# Patient Record
Sex: MALE | Race: WHITE | NOT HISPANIC OR LATINO | Employment: STUDENT | URBAN - METROPOLITAN AREA
[De-identification: names, ages, dates, MRNs, and addresses within clinical notes are randomized per-mention and may not be internally consistent; named-entity substitution may affect disease eponyms.]

---
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
## 2025-03-10 ENCOUNTER — OFFICE VISIT (OUTPATIENT)
Dept: INTERNAL MEDICINE | Age: 20
End: 2025-03-10
Payer: COMMERCIAL

## 2025-03-10 VITALS
HEART RATE: 68 BPM | OXYGEN SATURATION: 99 % | SYSTOLIC BLOOD PRESSURE: 114 MMHG | BODY MASS INDEX: 22.46 KG/M2 | WEIGHT: 160.4 LBS | TEMPERATURE: 98.6 F | HEIGHT: 71 IN | DIASTOLIC BLOOD PRESSURE: 70 MMHG

## 2025-03-10 DIAGNOSIS — R53.83 FATIGUE, UNSPECIFIED TYPE: Primary | ICD-10-CM

## 2025-03-10 DIAGNOSIS — Z11.59 NEED FOR HEPATITIS C SCREENING TEST: ICD-10-CM

## 2025-03-10 DIAGNOSIS — G47.9 SLEEP DISTURBANCE: ICD-10-CM

## 2025-03-10 DIAGNOSIS — G43.709 CHRONIC MIGRAINE WITHOUT AURA WITHOUT STATUS MIGRAINOSUS, NOT INTRACTABLE: ICD-10-CM

## 2025-03-10 LAB
25(OH)D3 SERPL-MCNC: 30.4 NG/ML (ref 30–100)
ALBUMIN SERPL-MCNC: 4.9 G/DL (ref 3.5–5.2)
ALBUMIN/GLOB SERPL: 2 G/DL
ALP SERPL-CCNC: 101 U/L (ref 39–117)
ALT SERPL W P-5'-P-CCNC: 12 U/L (ref 1–41)
ANION GAP SERPL CALCULATED.3IONS-SCNC: 8.9 MMOL/L (ref 5–15)
AST SERPL-CCNC: 16 U/L (ref 1–40)
BASOPHILS # BLD AUTO: 0.03 10*3/MM3 (ref 0–0.2)
BASOPHILS NFR BLD AUTO: 0.6 % (ref 0–1.5)
BILIRUB SERPL-MCNC: 0.8 MG/DL (ref 0–1.2)
BUN SERPL-MCNC: 10 MG/DL (ref 6–20)
BUN/CREAT SERPL: 8.9 (ref 7–25)
CALCIUM SPEC-SCNC: 10.1 MG/DL (ref 8.6–10.5)
CHLORIDE SERPL-SCNC: 102 MMOL/L (ref 98–107)
CO2 SERPL-SCNC: 29.1 MMOL/L (ref 22–29)
CREAT SERPL-MCNC: 1.12 MG/DL (ref 0.76–1.27)
DEPRECATED RDW RBC AUTO: 37.4 FL (ref 37–54)
EGFRCR SERPLBLD CKD-EPI 2021: 97.1 ML/MIN/1.73
EOSINOPHIL # BLD AUTO: 0.13 10*3/MM3 (ref 0–0.4)
EOSINOPHIL NFR BLD AUTO: 2.7 % (ref 0.3–6.2)
ERYTHROCYTE [DISTWIDTH] IN BLOOD BY AUTOMATED COUNT: 12 % (ref 12.3–15.4)
ERYTHROCYTE [SEDIMENTATION RATE] IN BLOOD: <1 MM/HR (ref 0–15)
FOLATE SERPL-MCNC: 12 NG/ML (ref 4.78–24.2)
GLOBULIN UR ELPH-MCNC: 2.4 GM/DL
GLUCOSE SERPL-MCNC: 73 MG/DL (ref 65–99)
HCT VFR BLD AUTO: 49.3 % (ref 37.5–51)
HCV AB SER QL: NORMAL
HGB BLD-MCNC: 17 G/DL (ref 13–17.7)
IMM GRANULOCYTES # BLD AUTO: 0.01 10*3/MM3 (ref 0–0.05)
IMM GRANULOCYTES NFR BLD AUTO: 0.2 % (ref 0–0.5)
IRON 24H UR-MRATE: 125 MCG/DL (ref 59–158)
LYMPHOCYTES # BLD AUTO: 1.39 10*3/MM3 (ref 0.7–3.1)
LYMPHOCYTES NFR BLD AUTO: 28.5 % (ref 19.6–45.3)
MAGNESIUM SERPL-MCNC: 2.2 MG/DL (ref 1.7–2.2)
MCH RBC QN AUTO: 29.4 PG (ref 26.6–33)
MCHC RBC AUTO-ENTMCNC: 34.5 G/DL (ref 31.5–35.7)
MCV RBC AUTO: 85.3 FL (ref 79–97)
MONOCYTES # BLD AUTO: 0.34 10*3/MM3 (ref 0.1–0.9)
MONOCYTES NFR BLD AUTO: 7 % (ref 5–12)
NEUTROPHILS NFR BLD AUTO: 2.97 10*3/MM3 (ref 1.7–7)
NEUTROPHILS NFR BLD AUTO: 61 % (ref 42.7–76)
NRBC BLD AUTO-RTO: 0 /100 WBC (ref 0–0.2)
PLATELET # BLD AUTO: 185 10*3/MM3 (ref 140–450)
PMV BLD AUTO: 12.3 FL (ref 6–12)
POTASSIUM SERPL-SCNC: 4.4 MMOL/L (ref 3.5–5.2)
PROT SERPL-MCNC: 7.3 G/DL (ref 6–8.5)
RBC # BLD AUTO: 5.78 10*6/MM3 (ref 4.14–5.8)
SODIUM SERPL-SCNC: 140 MMOL/L (ref 136–145)
T4 FREE SERPL-MCNC: 1.5 NG/DL (ref 0.92–1.68)
TSH SERPL DL<=0.05 MIU/L-ACNC: 1.45 UIU/ML (ref 0.27–4.2)
VIT B12 BLD-MCNC: 280 PG/ML (ref 211–946)
WBC NRBC COR # BLD AUTO: 4.87 10*3/MM3 (ref 3.4–10.8)

## 2025-03-10 PROCEDURE — 83540 ASSAY OF IRON: CPT | Performed by: NURSE PRACTITIONER

## 2025-03-10 PROCEDURE — 82607 VITAMIN B-12: CPT | Performed by: NURSE PRACTITIONER

## 2025-03-10 PROCEDURE — 82306 VITAMIN D 25 HYDROXY: CPT | Performed by: NURSE PRACTITIONER

## 2025-03-10 PROCEDURE — 83735 ASSAY OF MAGNESIUM: CPT | Performed by: NURSE PRACTITIONER

## 2025-03-10 PROCEDURE — 82746 ASSAY OF FOLIC ACID SERUM: CPT | Performed by: NURSE PRACTITIONER

## 2025-03-10 PROCEDURE — 85652 RBC SED RATE AUTOMATED: CPT | Performed by: NURSE PRACTITIONER

## 2025-03-10 PROCEDURE — 86376 MICROSOMAL ANTIBODY EACH: CPT | Performed by: NURSE PRACTITIONER

## 2025-03-10 PROCEDURE — 99214 OFFICE O/P EST MOD 30 MIN: CPT | Performed by: NURSE PRACTITIONER

## 2025-03-10 PROCEDURE — 86803 HEPATITIS C AB TEST: CPT | Performed by: NURSE PRACTITIONER

## 2025-03-10 PROCEDURE — 84439 ASSAY OF FREE THYROXINE: CPT | Performed by: NURSE PRACTITIONER

## 2025-03-10 PROCEDURE — 80050 GENERAL HEALTH PANEL: CPT | Performed by: NURSE PRACTITIONER

## 2025-03-10 NOTE — PROGRESS NOTES
Answers submitted by the patient for this visit:  Problem not listed (Submitted on 3/3/2025)  Chief Complaint: Other medical problem  Reason for appointment: Fatigue  abdominal pain: No  anorexia: No  joint pain: No  change in stool: No  chest pain: No  chills: No  nasal congestion: No  cough: No  diaphoresis: Yes  fatigue: Yes  fever: No  headaches: Yes  joint swelling: No  myalgias: No  nausea: No  neck pain: No  numbness: No  rash: No  sore throat: No  swollen glands: No  dysuria: No  vertigo: No  visual change: No  vomiting: No  weakness: No  Onset: in the past 7 days  Chronicity: new  Frequency: daily  Additional information: History of myopericarditis and mono  Chief Complaint  Fatigue (The patient is coming in complaining of extreme fatigue. He states he gets tired very quick. He says host days he is getting 8 hours of sleep. The patient did have mono September of 24. )    Subjective      History of Present Illness  The patient is a 19-year-old male who presents for evaluation of fatigue, migraines, and sleep disturbance. He is accompanied by his mother.    He has been experiencing persistent fatigue since his diagnosis of myopericarditis 2 years ago, which was followed by an episode of mononucleosis last fall. Despite the resolution of symptoms from these conditions, he continues to feel chronically exhausted. He also reports occasional mild pressure in the cardiac region, although it is significantly less severe than during his myopericarditis episode. His mother notes that he has not fully recovered, with intermittent periods of wellness. He is currently attending college and has a scheduled appointment with his cardiologist at [facility] on 03/25/2025, where an echocardiogram and comprehensive workup are planned. He also has a lab order from Dr. Lee for the end of May 2025. His mother, who has a history of autoimmune conditions, is concerned about the possibility of a similar issue in him. He  underwent blood work when he had mono in September 2024, but the results are not available. He occasionally experiences palpitations, but these are not consistent.    He reports a decrease in the frequency of his migraines, which now occur approximately once every other week, down from 3 times a week. He has been taking magnesium supplements since last summer, which have been beneficial. He also takes Excedrin or Tylenol as needed. He has a history of headaches since childhood, with a strong family history of migraines. His migraines are unpredictable, sometimes accompanied by nausea and requiring rest, while other times they are less severe.    His sleep quality has deteriorated, with difficulty falling asleep and persistent tiredness throughout the day. He reports no stress at home or with his roommates.    Supplemental Information  He had asthma like his mother and outgrew it so far.    FAMILY HISTORY  The patient's mother has Graves' disease, mixed connective tissue disease, and has had pericarditis 2 or 3 times related to autoimmune conditions. The patient's maternal grandmother has Ménière's disease, and the patient's mother has BPPV and possible vestibular migraines. The patient's father had late-onset diabetes.    MEDICATIONS  Current: magnesium supplements, Excedrin, Tylenol       Past Medical History:   Diagnosis Date    Allergic     Ongoing    Allergies     Asthma     childhood    History of medical problems January 2023    Myopericarditis    Ingrown toenail 2021    Migraines     myoPericarditis     Verruca 2019        Past Surgical History:   Procedure Laterality Date    COLONOSCOPY N/A 02/23/2024    Procedure: COLONOSCOPY;  Surgeon: Rustam Ford MD;  Location: [facility] END[facility];  Service: Gastroenterology;  Laterality: N/A;  NORMAL    FOOT SURGERY      WISDOM TOOTH EXTRACTION          Social History     Tobacco Use   Smoking Status Never    Passive exposure: Never   Smokeless Tobacco Never     "    Patient Care Team:  Anatoly Barrera MD as PCP - General (Internal Medicine)    No Known Allergies       Current Outpatient Medications:     cetirizine (zyrTEC) 10 MG tablet, Take 1 tablet by mouth Daily., Disp: 10 tablet, Rfl: 0    docusate sodium (COLACE) 50 MG capsule, Take 2 capsules by mouth 2 (Two) Times a Day., Disp: , Rfl:     montelukast (Singulair) 10 MG tablet, Take 1 tablet by mouth Every Night., Disp: 90 tablet, Rfl: 3    vitamin C (ASCORBIC ACID) 250 MG tablet, Take 1 tablet by mouth Daily., Disp: , Rfl:     vitamin D3 125 MCG (5000 UT) capsule capsule, Take 1 capsule by mouth Daily., Disp: , Rfl:     zinc sulfate (ZINCATE) 220 (50 Zn) MG capsule, Take 1 capsule by mouth Daily., Disp: , Rfl:     Objective     Vitals:    03/10/25 1332   BP: 114/70   BP Location: Left arm   Patient Position: Sitting   Cuff Size: Large Adult   Pulse: 68   Temp: 98.6 °F (37 °C)   TempSrc: Temporal   SpO2: 99%   Weight: 72.8 kg (160 lb 6.4 oz)   Height: 180.3 cm (71\")        Wt Readings from Last 3 Encounters:   03/10/25 72.8 kg (160 lb 6.4 oz) (61%, Z= 0.27)*   10/13/24 76.2 kg (168 lb) (72%, Z= 0.60)*   08/13/24 73.9 kg (163 lb) (67%, Z= 0.45)*     * Growth percentiles are based on CDC (Boys, 2-20 Years) data.        BP Readings from Last 3 Encounters:   03/10/25 114/70   10/13/24 132/78   08/13/24 144/87          Physical Exam  Vitals reviewed.   Constitutional:       General: He is not in acute distress.  HENT:      Head: Normocephalic and atraumatic.   Neck:      Thyroid: No thyroid mass.   Cardiovascular:      Rate and Rhythm: Normal rate and regular rhythm.   Pulmonary:      Effort: Pulmonary effort is normal.      Breath sounds: Normal breath sounds. No wheezing, rhonchi or rales.   Abdominal:      General: There is no distension.      Palpations: Abdomen is soft. There is no mass.      Tenderness: There is no abdominal tenderness.   Musculoskeletal:      Right lower leg: No edema.      Left lower leg: No " edema.   Lymphadenopathy:      Cervical: No cervical adenopathy.   Skin:     General: Skin is warm and dry.   Neurological:      General: No focal deficit present.      Mental Status: He is alert.   Psychiatric:         Thought Content: Thought content normal.                Result Review   The following data was reviewed by: ANUPAMA Navarro on 03/10/2025:  [x]  Tests & Results  []  Hospitalization/Emergency Department/Urgent Care  [x]  Internal/External Consultant Notes       Assessment and Plan     Diagnoses and all orders for this visit:    1. Fatigue, unspecified type (Primary)  -     Comprehensive Metabolic Panel; Future  -     CBC & Differential; Future  -     Vitamin D,25-Hydroxy; Future  -     TSH+Free T4; Future  -     Vitamin B12; Future  -     Folate; Future  -     Iron; Future  -     Thyroid Peroxidase Antibody; Future  -     Magnesium; Future  -     Sedimentation Rate; Future  -     KYLE by IFA, Reflex 9-biomarkers profile; Future  -     Sedimentation Rate  -     Magnesium  -     Thyroid Peroxidase Antibody  -     Iron  -     Folate  -     Vitamin B12  -     TSH+Free T4  -     Vitamin D,25-Hydroxy  -     Comprehensive Metabolic Panel  -     CBC & Differential    2. Chronic migraine without aura without status migrainosus, not intractable    3. Sleep disturbance    4. Need for hepatitis C screening test  -     Hepatitis C Antibody; Future  -     Hepatitis C Antibody         Assessment & Plan  1. Fatigue.  The etiology of the fatigue could be multifactorial, potentially stemming from vitamin D deficiency or sleep disturbances. A comprehensive panel of laboratory tests will be conducted, including thyroid studies with thyroid peroxidase antibody test, vitamin D, vitamin B12, folate, magnesium level, hepatitis C antibody, iron level, sed rate, and KYLE. He has been advised to consult with his cardiologist regarding the safety of engaging in pickleball.    2. Migraines.  He has been counseled against the  use of Excedrin, Fioricet, and ibuprofen for migraine management due to the risk of rebound headaches. Samples of Ubrelvy have been provided for use as needed during migraine episodes. He has been instructed to take one pill at the onset of a migraine and to carry a pill with him. If the headache persists after 2 hours, he may take a second dose.    3. Sleep disturbance.  Check labs today and follow-up with PCP as scheduled.    Pediatric BMI = 46 %ile (Z= -0.09) based on CDC (Boys, 2-20 Years) BMI-for-age based on BMI available on 3/10/2025.. BMI is within normal parameters. No other follow-up for BMI required.       Patient was given instructions and counseling regarding his condition or for health maintenance advice. Please see specific information pulled into the AVS if appropriate.     Follow Up   Return for Next scheduled follow up.    Patient or patient representative verbalized consent for the use of Ambient Listening during the visit with  ANUPAMA Navarro for chart documentation. 3/10/2025  14:12 EDT    ANUPAMA Navarro

## 2025-03-12 LAB — THYROPEROXIDASE AB SERPL-ACNC: 10 IU/ML (ref 0–26)

## 2025-03-14 ENCOUNTER — LAB (OUTPATIENT)
Dept: LAB | Facility: HOSPITAL | Age: 20
End: 2025-03-14
Payer: COMMERCIAL

## 2025-03-14 DIAGNOSIS — R53.83 FATIGUE, UNSPECIFIED TYPE: ICD-10-CM

## 2025-03-14 PROCEDURE — 86038 ANTINUCLEAR ANTIBODIES: CPT

## 2025-03-14 PROCEDURE — 36415 COLL VENOUS BLD VENIPUNCTURE: CPT

## 2025-03-17 LAB
ANA SER QL IF: NEGATIVE
LABORATORY COMMENT REPORT: NORMAL

## 2025-04-18 RX ORDER — MONTELUKAST SODIUM 10 MG/1
10 TABLET ORAL NIGHTLY
Qty: 90 TABLET | Refills: 3 | Status: SHIPPED | OUTPATIENT
Start: 2025-04-18

## 2025-05-19 ENCOUNTER — TELEPHONE (OUTPATIENT)
Dept: INTERNAL MEDICINE | Age: 20
End: 2025-05-19
Payer: COMMERCIAL

## 2025-05-19 ENCOUNTER — HOSPITAL ENCOUNTER (EMERGENCY)
Facility: HOSPITAL | Age: 20
Discharge: HOME | End: 2025-05-19
Attending: EMERGENCY MEDICINE | Admitting: EMERGENCY MEDICINE
Payer: COMMERCIAL

## 2025-05-19 VITALS
HEIGHT: 71 IN | TEMPERATURE: 98.2 F | WEIGHT: 162.7 LBS | DIASTOLIC BLOOD PRESSURE: 69 MMHG | SYSTOLIC BLOOD PRESSURE: 101 MMHG | OXYGEN SATURATION: 96 % | HEART RATE: 52 BPM | BODY MASS INDEX: 22.78 KG/M2 | RESPIRATION RATE: 16 BRPM

## 2025-05-19 DIAGNOSIS — R21 RASH: ICD-10-CM

## 2025-05-19 DIAGNOSIS — T78.40XA ALLERGIC REACTION, INITIAL ENCOUNTER: Primary | ICD-10-CM

## 2025-05-19 DIAGNOSIS — T78.40XD ALLERGIC REACTION, SUBSEQUENT ENCOUNTER: Primary | ICD-10-CM

## 2025-05-19 DIAGNOSIS — R22.0 SWELLING OF UPPER LIP: ICD-10-CM

## 2025-05-19 PROCEDURE — 25010000002 FAMOTIDINE 10 MG/ML SOLUTION: Performed by: NURSE PRACTITIONER

## 2025-05-19 PROCEDURE — 99282 EMERGENCY DEPT VISIT SF MDM: CPT

## 2025-05-19 PROCEDURE — 25010000002 METHYLPREDNISOLONE PER 125 MG: Performed by: NURSE PRACTITIONER

## 2025-05-19 PROCEDURE — 96375 TX/PRO/DX INJ NEW DRUG ADDON: CPT

## 2025-05-19 PROCEDURE — 25010000002 DIPHENHYDRAMINE PER 50 MG: Performed by: NURSE PRACTITIONER

## 2025-05-19 PROCEDURE — 96374 THER/PROPH/DIAG INJ IV PUSH: CPT

## 2025-05-19 RX ORDER — FAMOTIDINE 20 MG/1
20 TABLET, FILM COATED ORAL 2 TIMES DAILY
Qty: 6 TABLET | Refills: 0 | Status: SHIPPED | OUTPATIENT
Start: 2025-05-19 | End: 2025-05-23 | Stop reason: SDUPTHER

## 2025-05-19 RX ORDER — DIPHENHYDRAMINE HYDROCHLORIDE 50 MG/ML
50 INJECTION, SOLUTION INTRAMUSCULAR; INTRAVENOUS ONCE
Status: COMPLETED | OUTPATIENT
Start: 2025-05-19 | End: 2025-05-19

## 2025-05-19 RX ORDER — DIPHENHYDRAMINE HCL 25 MG
25 CAPSULE ORAL EVERY 6 HOURS
Qty: 12 CAPSULE | Refills: 0 | Status: SHIPPED | OUTPATIENT
Start: 2025-05-19 | End: 2025-05-19

## 2025-05-19 RX ORDER — EPINEPHRINE 0.3 MG/.3ML
0.3 INJECTION SUBCUTANEOUS ONCE
Qty: 1 EACH | Refills: 0 | Status: SHIPPED | OUTPATIENT
Start: 2025-05-19 | End: 2025-05-19

## 2025-05-19 RX ORDER — PREDNISONE 20 MG/1
60 TABLET ORAL DAILY
Qty: 9 TABLET | Refills: 0 | Status: SHIPPED | OUTPATIENT
Start: 2025-05-19 | End: 2025-05-23

## 2025-05-19 RX ORDER — FAMOTIDINE 20 MG/1
20 TABLET, FILM COATED ORAL 2 TIMES DAILY
Qty: 6 TABLET | Refills: 0 | Status: SHIPPED | OUTPATIENT
Start: 2025-05-19 | End: 2025-05-19

## 2025-05-19 RX ORDER — DIPHENHYDRAMINE HCL 25 MG
25 CAPSULE ORAL EVERY 6 HOURS
Qty: 12 CAPSULE | Refills: 0 | Status: SHIPPED | OUTPATIENT
Start: 2025-05-19 | End: 2025-05-22

## 2025-05-19 RX ORDER — FAMOTIDINE 10 MG/ML
20 INJECTION, SOLUTION INTRAVENOUS ONCE
Status: COMPLETED | OUTPATIENT
Start: 2025-05-19 | End: 2025-05-19

## 2025-05-19 RX ORDER — PREDNISONE 20 MG/1
60 TABLET ORAL DAILY
Qty: 9 TABLET | Refills: 0 | Status: SHIPPED | OUTPATIENT
Start: 2025-05-19 | End: 2025-05-19

## 2025-05-19 RX ADMIN — DIPHENHYDRAMINE HYDROCHLORIDE 50 MG: 50 INJECTION, SOLUTION INTRAMUSCULAR; INTRAVENOUS at 03:19

## 2025-05-19 RX ADMIN — METHYLPREDNISOLONE SODIUM SUCCINATE 125 MG: 125 INJECTION INTRAMUSCULAR; INTRAVENOUS at 03:19

## 2025-05-19 RX ADMIN — FAMOTIDINE 20 MG: 10 INJECTION INTRAVENOUS at 03:19

## 2025-05-19 NOTE — ED PROVIDER NOTES
"SHARED VISIT ATTESTATION:    This visit was performed by myself and an APC.  I personally approved the management plan/medical decision making and take responsibility for the patient management.      SHARED VISIT NOTE:    Patient is 19 y.o. year old male that presents to the ED for evaluation of allergic reaction.     Physical Exam    ED Course:    /93   Pulse 71   Temp 98 °F (36.7 °C) (Oral)   Resp 18   Ht 180.3 cm (71\")   Wt 73.8 kg (162 lb 11.2 oz)   SpO2 98%   BMI 22.69 kg/m²       The following orders were placed and all results were independently analyzed by me:  No orders of the defined types were placed in this encounter.      Medications Given in the Emergency Department:  Medications   famotidine (PEPCID) injection 20 mg (has no administration in time range)   methylPREDNISolone sodium succinate (SOLU-Medrol) 125 mg in sterile water (preservative free) 2 mL (has no administration in time range)   diphenhydrAMINE (BENADRYL) injection 50 mg (has no administration in time range)        ED Course:    ED Course as of 05/19/25 2113   Mon May 19, 2025   0503 Patient rash has resolved and lip swelling has improved and is just trace apparent [DS]      ED Course User Index  [DS] Marisela Munoz APRN       Labs:    Lab Results (last 24 hours)       ** No results found for the last 24 hours. **             Imaging:    No Radiology Exams Resulted Within Past 24 Hours    MDM:    Procedures                         Yousuf Boudreaux MD  03:05 EDT  05/19/25         Yousuf Boudreaux MD  05/19/25 2114    "

## 2025-05-19 NOTE — TELEPHONE ENCOUNTER
Caller: CARLY MEHTA    Relationship: Mother    Best call back number: [phone]    What orders are you requesting (i.e. lab or imaging): TRYPTASE ORDER    In what timeframe would the patient need to come in: BEFORE NEXT APPOINTMENT

## 2025-05-19 NOTE — DISCHARGE INSTRUCTIONS
Follow-up with allergist of choice    Take medications as prescribed for the next 3 days to treat allergic reaction to unknown substance

## 2025-05-19 NOTE — ED PROVIDER NOTES
Time: 2:53 AM EDT  Date of encounter:  2025  Independent Historian/Clinical History and Information was obtained by:   Patient    History is limited by: N/A    Chief Complaint: allergic reaction      History of Present Illness:  Patient is a 19 y.o. year old male who presents to the emergency department for evaluation of possible allergic reaction.  2 hours prior to arrival patient stated he noticed some tingling in his lip and then he noticed some swelling in the upper lip with a rash to his neck face and trunk.  He started feeling some chest tightness but not really short of breath and no wheezing.  Patient had no known cause or source.  His father had an EpiPen that was  so he did not use it and came in.  Took 1 Benadryl prior to arrival.  Nausea vomiting      Patient Care Team  Primary Care Provider: Anatoly Barrera MD    Past Medical History:     No Known Allergies  Past Medical History:   Diagnosis Date    Allergic     Ongoing    Allergies     Asthma     childhood    History of medical problems 2023    Myopericarditis    Ingrown toenail     Migraines     myoPericarditis     Verruca      Past Surgical History:   Procedure Laterality Date    COLONOSCOPY N/A 2024    Procedure: COLONOSCOPY;  Surgeon: Rustam Ford MD;  Location: [facility] ENDOSCOPY;  Service: Gastroenterology;  Laterality: N/A;  NORMAL    FOOT SURGERY      WISDOM TOOTH EXTRACTION       Family History   Problem Relation Age of Onset    Irritable bowel syndrome Mother     Arthritis Mother     Asthma Mother     Hyperlipidemia Mother     Thyroid disease Mother     Colon polyps Father     No Known Problems Sister     No Known Problems Brother     Arthritis Maternal Grandmother     Colon polyps Maternal Grandfather     Diabetes Maternal Grandfather     Hyperlipidemia Maternal Grandfather     No Known Problems Paternal Grandmother     No Known Problems Paternal Grandfather     No Known Problems Daughter      No Known Problems Son     No Known Problems Cousin     No Known Problems Other     Rheum arthritis Neg Hx     Osteoarthritis Neg Hx     Heart failure Neg Hx     Hypertension Neg Hx     Migraines Neg Hx     Rashes / Skin problems Neg Hx     Seizures Neg Hx     Stroke Neg Hx     Colon cancer Neg Hx        Home Medications:  Prior to Admission medications    Medication Sig Start Date End Date Taking? Authorizing Provider   cetirizine (zyrTEC) 10 MG tablet Take 1 tablet by mouth Daily. 8/4/23   Jaden Mclaughlin DO   docusate sodium (COLACE) 50 MG capsule Take 2 capsules by mouth 2 (Two) Times a Day.    Tony Byrd MD   montelukast (SINGULAIR) 10 MG tablet TAKE 1 TABLET BY MOUTH EVERY NIGHT 4/18/25   Anatoly Barrera MD   vitamin C (ASCORBIC ACID) 250 MG tablet Take 1 tablet by mouth Daily.    Tony Byrd MD   vitamin D3 125 MCG (5000 UT) capsule capsule Take 1 capsule by mouth Daily.    Tony Byrd MD   zinc sulfate (ZINCATE) 220 (50 Zn) MG capsule Take 1 capsule by mouth Daily.    Tony Byrd MD        Social History:   Social History     Tobacco Use    Smoking status: Never     Passive exposure: Never    Smokeless tobacco: Never   Vaping Use    Vaping status: Never Used   Substance Use Topics    Alcohol use: Never    Drug use: Never         Review of Systems:  Review of Systems   Constitutional:  Negative for chills and fever.   HENT:  Positive for facial swelling (Upper lip). Negative for congestion, ear pain and sore throat.    Eyes:  Negative for pain.   Respiratory:  Positive for chest tightness. Negative for cough and shortness of breath.    Cardiovascular:  Negative for chest pain.   Gastrointestinal:  Negative for abdominal pain, diarrhea, nausea and vomiting.   Genitourinary:  Negative for flank pain and hematuria.   Musculoskeletal:  Negative for joint swelling.   Skin:  Positive for rash. Negative for pallor.   Neurological:  Negative for seizures and headaches.  "  All other systems reviewed and are negative.       Physical Exam:  /67 (BP Location: Left arm, Patient Position: Lying)   Pulse 50   Temp 98 °F (36.7 °C) (Oral)   Resp 16   Ht 180.3 cm (71\")   Wt 73.8 kg (162 lb 11.2 oz)   SpO2 95%   BMI 22.69 kg/m²     Physical Exam  Vitals and nursing note reviewed.   Constitutional:       General: He is not in acute distress.     Appearance: Normal appearance. He is not toxic-appearing.   HENT:      Head: Normocephalic and atraumatic.      Right Ear: Tympanic membrane, ear canal and external ear normal.      Left Ear: Tympanic membrane, ear canal and external ear normal.      Nose: Nose normal.      Mouth/Throat:      Mouth: Mucous membranes are moist.      Comments: Uvula midline and nonedematous.    No tongue swelling.    Mild swelling upper lip only mid aspect  Eyes:      General: No scleral icterus.     Conjunctiva/sclera: Conjunctivae normal.   Cardiovascular:      Rate and Rhythm: Normal rate and regular rhythm.      Pulses: Normal pulses.      Heart sounds: Normal heart sounds.   Pulmonary:      Effort: Pulmonary effort is normal. No respiratory distress.      Breath sounds: Normal breath sounds.   Abdominal:      General: Abdomen is flat. Bowel sounds are normal.      Palpations: Abdomen is soft.      Tenderness: There is no abdominal tenderness.   Musculoskeletal:         General: Normal range of motion.      Cervical back: Normal range of motion and neck supple.   Skin:     General: Skin is warm and dry.      Findings: Rash (Some macular erythema to the back of the neck and some scant urticaria to trunk) present.   Neurological:      Mental Status: He is alert and oriented to person, place, and time.   Psychiatric:         Mood and Affect: Mood normal.         Behavior: Behavior normal.                    Medical Decision Making:      Comorbidities that affect care:    Asthma    External Notes reviewed:    Previous Clinic Note: Patient with seen by " cardiology in March 25 for myopericarditis history and chest pressure      The following orders were placed and all results were independently analyzed by me:  No orders of the defined types were placed in this encounter.      Medications Given in the Emergency Department:  Medications   famotidine (PEPCID) injection 20 mg (20 mg Intravenous Given 5/19/25 0319)   methylPREDNISolone sodium succinate (SOLU-Medrol) 125 mg in sterile water (preservative free) 2 mL (125 mg Intravenous Given 5/19/25 0319)   diphenhydrAMINE (BENADRYL) injection 50 mg (50 mg Intravenous Given 5/19/25 0319)        ED Course:    ED Course as of 05/19/25 0504   Mon May 19, 2025   0503 Patient rash has resolved and lip swelling has improved and is just trace apparent [DS]      ED Course User Index  [DS] Marisela Munoz APRN       Labs:    Lab Results (last 24 hours)       ** No results found for the last 24 hours. **             Imaging:    No Radiology Exams Resulted Within Past 24 Hours      Differential Diagnosis and Discussion:    Allergic Reaction: Differential diagnosis includes but is not limited to drug side effects, contact dermatitis, autoimmune conditions, infections, mast cell disorders, serum sickness, anaphylactoid reactions, angioedema, panic or anxiety attacks.    PROCEDURES:        No orders to display       Procedures    MDM  Number of Diagnoses or Management Options  Allergic reaction, initial encounter  Diagnosis management comments: The patient was monitored in the ED for 2 hours. The patient reports significant relief of their symptoms. The patient denies shortness of breath, tongue and neck swelling, or altered mental status. The patient has no respiratory distress, hypoxia, and has stable and suitable vital signs for discharge. The patient was counseled to follow up with a primary care physician in 2-3 days. The patient was given a prescription for benadryl, pepcid, and a steroid. The patient was counseled to return to the  ED for any worsening of their symptoms or for any reassessment that they may need. Patient was counseled to return especially for shortness of breath, neck and tongue swelling, chest pain, respiratory difficulty, uncontrollable fever, or altered mental status.       Amount and/or Complexity of Data Reviewed  Tests in the medicine section of CPT®: ordered and reviewed  Obtain history from someone other than the patient: yes (Father)    Risk of Complications, Morbidity, and/or Mortality  Presenting problems: low  Management options: low    Patient Progress  Patient progress: stable             Patient Care Considerations:    LABS: I considered ordering labs, however not indicated for this complaint      Consultants/Shared Management Plan:    SHARED VISIT: I have discussed the case with my supervising physician, dr benavides who states okay to send home with meds after 2-hour observation if patient improved or stable. The substantive portion of the medical decision was made by the attesting physician who made or approve the management plan and will take responsibility for the patient.  Clinical findings were discussed and ultimate disposition was made in consult with supervising physician.    Social Determinants of Health:    Patient has presented with family members who are responsible, reliable and will ensure follow up care.      Disposition and Care Coordination:    Discharged: I considered escalation of care by admitting this patient to the hospital, however patient improved after medication    I have explained the patient´s condition, diagnoses and treatment plan based on the information available to me at this time. I have answered questions and addressed any concerns. The patient has a good  understanding of the patient´s diagnosis, condition, and treatment plan as can be expected at this point. The vital signs have been stable. The patient´s condition is stable and appropriate for discharge from the emergency  department.      The patient will pursue further outpatient evaluation with the primary care physician or other designated or consulting physician as outlined in the discharge instructions. They are agreeable to this plan of care and follow-up instructions have been explained in detail. The patient has received these instructions in written format and has expressed an understanding of the discharge instructions. The patient is aware that any significant change in condition or worsening of symptoms should prompt an immediate return to this or the closest emergency department or call to 911.  I have explained discharge medications and the need for follow up with the patient/caretakers. This was also printed in the discharge instructions. Patient was discharged with the following medications and follow up:      Medication List        New Prescriptions      diphenhydrAMINE 25 mg capsule  Commonly known as: BENADRYL  Take 1 capsule by mouth Every 6 (Six) Hours for 3 days.     EPINEPHrine 0.3 MG/0.3ML solution auto-injector injection  Commonly known as: EPIPEN  Inject 0.3 mL under the skin into the appropriate area as directed 1 (One) Time for 1 dose.     famotidine 20 MG tablet  Commonly known as: PEPCID  Take 1 tablet by mouth 2 (Two) Times a Day for 3 days.     predniSONE 20 MG tablet  Commonly known as: DELTASONE  Take 3 tablets by mouth Daily for 3 days.               Where to Get Your Medications        These medications were sent to [id number] - [place] - [address] AT [facility] [phone]  - [phone] FX  [address] [place] [address]      Phone: [phone]   diphenhydrAMINE 25 mg capsule  EPINEPHrine 0.3 MG/0.3ML solution auto-injector injection  famotidine 20 MG tablet  predniSONE 20 MG tablet      [facility] [place]  [address] [facility] [id number] [phone]    As needed       Final diagnoses:    Allergic reaction, initial encounter        ED Disposition       ED Disposition   Discharge    Condition   Stable    Comment   --               This medical record created using voice recognition software.             Marisela Munoz, ANUPAMA  05/19/25 0440       Marisela Munoz, ANUPAMA  05/19/25 0506

## 2025-05-19 NOTE — TELEPHONE ENCOUNTER
Spoke w/pt's mother Shannan, stated pt seen last night for allergic rxn w/unknown source on pt's upper lip.  Had rash, swelling, chest tightness.     Requesting test to check for mast cells.    Order pended for you if agreeable.

## 2025-05-20 ENCOUNTER — TELEPHONE (OUTPATIENT)
Dept: INTERNAL MEDICINE | Age: 20
End: 2025-05-20

## 2025-05-20 NOTE — TELEPHONE ENCOUNTER
Caller: CARLY MEHTA    Relationship: Mother    Best call back number: [phone]    What is the best time to reach you: ANYTIME    Who are you requesting to speak with (clinical staff, provider,  specific staff member): NURSE         What was the call regarding: PATIENT'S MOTHER IS CALLING TO LET PCP KNOW THAT THEY HAVE ALREADY CALLED THE [facility]CTOR AND HAVE AN APPOINTMENT SCHEDULED.      no

## 2025-05-23 ENCOUNTER — OFFICE VISIT (OUTPATIENT)
Age: 20
End: 2025-05-23
Payer: COMMERCIAL

## 2025-05-23 ENCOUNTER — LAB (OUTPATIENT)
Dept: LAB | Facility: HOSPITAL | Age: 20
End: 2025-05-23
Payer: COMMERCIAL

## 2025-05-23 VITALS
WEIGHT: 156.1 LBS | OXYGEN SATURATION: 97 % | HEART RATE: 73 BPM | HEIGHT: 71 IN | SYSTOLIC BLOOD PRESSURE: 100 MMHG | BODY MASS INDEX: 21.85 KG/M2 | DIASTOLIC BLOOD PRESSURE: 62 MMHG

## 2025-05-23 DIAGNOSIS — T78.40XD ALLERGIC REACTION, SUBSEQUENT ENCOUNTER: ICD-10-CM

## 2025-05-23 DIAGNOSIS — T78.40XD ALLERGIC REACTION, SUBSEQUENT ENCOUNTER: Primary | ICD-10-CM

## 2025-05-23 PROCEDURE — 36415 COLL VENOUS BLD VENIPUNCTURE: CPT

## 2025-05-23 PROCEDURE — 83520 IMMUNOASSAY QUANT NOS NONAB: CPT

## 2025-05-23 RX ORDER — PREDNISONE 10 MG/1
10 TABLET ORAL 2 TIMES DAILY
Qty: 20 TABLET | Refills: 0 | Status: SHIPPED | OUTPATIENT
Start: 2025-05-23 | End: 2025-05-23 | Stop reason: SDUPTHER

## 2025-05-23 RX ORDER — FAMOTIDINE 20 MG/1
TABLET, FILM COATED ORAL
COMMUNITY
Start: 2025-05-21 | End: 2025-05-23 | Stop reason: SDUPTHER

## 2025-05-23 RX ORDER — FAMOTIDINE 20 MG/1
20 TABLET, FILM COATED ORAL 2 TIMES DAILY
Qty: 60 TABLET | Refills: 0 | Status: SHIPPED | OUTPATIENT
Start: 2025-05-23 | End: 2025-05-23 | Stop reason: SDUPTHER

## 2025-05-23 RX ORDER — FAMOTIDINE 20 MG/1
20 TABLET, FILM COATED ORAL 2 TIMES DAILY
Qty: 60 TABLET | Refills: 0 | Status: SHIPPED | OUTPATIENT
Start: 2025-05-23

## 2025-05-23 RX ORDER — PREDNISONE 10 MG/1
10 TABLET ORAL 2 TIMES DAILY
Qty: 20 TABLET | Refills: 0 | Status: SHIPPED | OUTPATIENT
Start: 2025-05-23

## 2025-05-23 RX ORDER — LANOLIN ALCOHOL/MO/W.PET/CERES
CREAM (GRAM) TOPICAL
COMMUNITY

## 2025-05-23 NOTE — PROGRESS NOTES
Chief Complaint  Allergic Reaction (Pt has started to have hives,so bad his  Lips swelled up hives all over back and stomach. Went to [facility] and they give him IV medication and meds. This happens at night. Did not start until he come home from College. )    Delfina Felipe is a 19 y.o. male who presents to [facility]     History of Present Illness  The patient presents for evaluation of hives.    He has been experiencing hives, indicative of an allergic reaction, since last week. He sought emergency care on 05/19/2025, where he was prescribed medication, steroids and Pepcid, already taking Singulair and Zyrtec, he also was and provided with an EpiPen. He has a scheduled appointment with an allergist on 07/28/2025. The cause of the reaction remains unknown to him. He reports no recent changes in diet, skincare products, or clothing that could have triggered the reaction. He describes his skin as mildly sensitive and prone to dryness.  But does not have a lot of allergies that he is aware.  During his ER visit, he experienced lip swelling, which has not recurred, but he continues to have a reactive skin on his back. He was prescribed prednisone, which he completed. He reports no wheezing or shortness of breath.  No further lip swelling he recently acquired a new mattress upon returning from college, but he had used it during spring break without any issues. He does not recall any recent tick bites, although he may have had one in early fall, which he promptly removed. He underwent blood work this morning. He has a history of asthma but has not experienced any related issues for several years.  Denies any shortness of breath or wheezing.  He does not believe he requires an inhaler. He does not smoke or vape. The medication provided by the ER was initially effective, but the hives have persisted. He ran out of the prednisone and experienced another episode this morning at 3  Patient: Simona De Los Santos. MRN: [id number]       SSN: [id number]  YOB: 1946        AGE: 70 [facility] ED on 11/8/[facility]. He also previously played basketball and goes bowling occasionally.   He reports that he was previously a metformin controlled diabetic until he lost about 79 "AM, which did disrupt his sleep, presenting with hives on his back and caused significant itching. . He has been managing the condition with Zyrtec and Singulair  and an EpiPen which he has not had to use.    I have asked him to see the allergist as planned have the labs drawn as instructed carry his EpiPen at all times I am going to add to his regimen Pepcid and extend the prednisone use.  I have asked him to journal all food products skin products any new surroundings.    He had pericarditis in his sachin year of high school.  No lasting issues from the condition that he is aware.    Review of Systems  Constitutional:  Negative for activity change, fatigue and fever.  HENT:  Negative for congestion, rhinorrhea and sore throat.  No swelling of lips  Eyes:  Negative for discharge and redness.  Respiratory:  Negative for cough. Negative for shortness of breath, wheezing and stridor.    Cardiovascular:  Negative for chest pain.  Gastrointestinal:  Negative for abdominal pain.  Genitourinary:  Negative for dysuria.  Musculoskeletal:  Negative for arthralgias.  Skin: Positive for intermittent hot  Neurological:  Negative for weakness and headaches.  Hematological:  Negative for adenopathy.        SOCIAL HISTORY  He does not smoke or vape.    FAMILY HISTORY  His grandpa has a thyroid disorder.  His mom has a lot of autoimmune type of issues.         Objective   Vital Signs:   Vitals:    05/23/25 1417   BP: 100/62   Pulse: 73   SpO2: 97%   Weight: 70.8 kg (156 lb 1.6 oz)   Height: 180.3 cm (70.98\")     Body mass index is 21.78 kg/m².    Wt Readings from Last 3 Encounters:   05/23/25 70.8 kg (156 lb 1.6 oz) (53%, Z= 0.08)*   05/19/25 73.8 kg (162 lb 11.2 oz) (63%, Z= 0.33)*   03/10/25 72.8 kg (160 lb 6.4 oz) (61%, Z= 0.27)*     * Growth percentiles are based on CDC (Boys, 2-20 Years) data.     BP Readings from Last 3 Encounters:   05/23/25 100/62   05/19/25 101/69   03/10/25 114/70       Health Maintenance   Topic Date Due " [facility] recently. He lives in [place]. [facility] Pneumococcal Vaccine 0-49 (2 of 2 - PPSV23) 11/29/2011    ANNUAL PHYSICAL  Never done    COVID-19 Vaccine (5 - 2024-25 season) 09/01/2024    INFLUENZA VACCINE  07/01/2025    TDAP/TD VACCINES (2 - Td or Tdap) 02/27/2027    HEPATITIS C SCREENING  Completed    MENINGOCOCCAL B VACCINE  Completed    MENINGOCOCCAL VACCINE  Completed    HPV VACCINES  Completed       Physical Exam  Vitals and nursing note reviewed.   Constitutional:       Appearance: Normal appearance.   HENT:      Head: Normocephalic.      Mouth/Throat:      Comments: No angioedema noted  Eyes:      Extraocular Movements: Extraocular movements intact.      Pupils: Pupils are equal, round, and reactive to light.   Cardiovascular:      Rate and Rhythm: Normal rate and regular rhythm.      Pulses: Normal pulses.   Pulmonary:      Effort: Pulmonary effort is normal.      Breath sounds: Normal breath sounds.      Comments: No wheezing rales or rhonchi noted  Abdominal:      Palpations: Abdomen is soft.   Musculoskeletal:         General: Normal range of motion.      Cervical back: Normal range of motion.   Skin:     General: Skin is warm and dry.      Comments: No current hives or angioedema   Neurological:      General: No focal deficit present.      Mental Status: He is alert.   Psychiatric:         Mood and Affect: Mood normal.         Behavior: Behavior normal.         Thought Content: Thought content normal.          Physical Exam  Respiratory: Clear to auscultation, no wheezing, rales or rhonchi  Cardiovascular: Regular rate and rhythm, no murmurs, rubs, or gallops       Result Review :  The following data was reviewed by: ANUPAMA Walsh on 05/23/2025:    Labs  No visits with results within 2 Month(s) from this visit.   Latest known visit with results is:   Lab on 03/14/2025   Component Date Value Ref Range Status    KYLE 03/14/2025 Negative   Final                                         Negative   <1:80                                       Borderline  1:80                                       Positive   >1:80  ICAP nomenclature: AC-0  For more information about Hep-2 cell patterns use  ANApatterns.org, the official website for the International  Consensus on Antinuclear Antibody (KYLE) Patterns (ICAP).    Please note 03/14/2025 Comment   Final    KYLE Multiplex methodology was designed to detect up to 11 antibodies  of the 100+ antibodies that may be detected by KYLE IFA methodology.        Imaging  No Images in the past 120 days found..    Results         Procedures         ASSESSMENT & PLAN  Diagnoses and all orders for this visit:    1. Allergic reaction, subsequent encounter (Primary)  -     famotidine (PEPCID) 20 MG tablet; Take 1 tablet by mouth 2 (Two) Times a Day.  Dispense: 60 tablet; Refill: 0  -     predniSONE (DELTASONE) 10 MG tablet; Take 1 tablet by mouth 2 (Two) Times a Day.  Dispense: 20 tablet; Refill: 0    Other orders  -     Discontinue: famotidine (PEPCID) 20 MG tablet; Take 1 tablet by mouth 2 (Two) Times a Day.  Dispense: 60 tablet; Refill: 0  -     Discontinue: predniSONE (DELTASONE) 10 MG tablet; Take 1 tablet by mouth 2 (Two) Times a Day.  Dispense: 20 tablet; Refill: 0         Assessment & Plan  1. Urticaria.  - Symptoms include itching and hives, primarily on the back, with no current swelling of the lips.  - Physical examination reveals no active hives; patient denies wheezing and shortness of breath.  No periorbital edema  - Discussion includes maintaining a symptom diary, washing bedding with dye-free soap, and monitoring diet for potential allergens. Patient advised to contact allergist's office for possible earlier appointments.  - Prescriptions include Pepcid 10 mg twice daily for 10 days, Zyrtec daily, Singulair, and Benadryl at night. Steroid dosage reduced to 10 mg twice daily for 10 days, to be taken with food. Instructions provided for emergency use of EpiPen and calling 911 in case of severe symptoms.       BMI is  -   within normal parameters. No other follow-up for BMI required.           FOLLOW UP  No follow-ups on file.  Patient was given instructions and counseling regarding his condition or for health maintenance advice. Please see specific information pulled into the AVS if appropriate.     Patient or patient representative verbalized consent for the use of Ambient Listening during the visit with  ANUPAMA Walsh for chart documentation. 5/27/2025  13:00 EDT    ANUPAMA Walsh  05/27/25  13:03 EDT           light yellow fluid. The fluid was discarded. After discussing treatment options, patient's right knee was injected with 2 cc of Euflexxa. He will follow up in one week for his second Euflexxa injection. We discussed possible need for left hip arthroplasty at some time in the future pending significant weight loss.   He will start the back on track program.    Scribed by Steve Herrera [facility]) as dictated by Echo Alberts MD

## 2025-05-27 NOTE — PATIENT INSTRUCTIONS
Continue to monitor any new products try to keep journal of food  Take medications daily as instructed  Any swelling of the lips shortness of air or wheezing use EpiPen and go the emergency room  See allergist as planned

## 2025-05-28 LAB — TRYPTASE SERPL-MCNC: 2.7 UG/L (ref 2.2–13.2)

## 2025-06-23 DIAGNOSIS — T78.40XD ALLERGIC REACTION, SUBSEQUENT ENCOUNTER: ICD-10-CM

## 2025-06-23 RX ORDER — FAMOTIDINE 20 MG/1
20 TABLET, FILM COATED ORAL 2 TIMES DAILY
Qty: 60 TABLET | Refills: 0 | Status: SHIPPED | OUTPATIENT
Start: 2025-06-23 | End: 2025-06-24 | Stop reason: SDUPTHER

## 2025-06-24 DIAGNOSIS — T78.40XD ALLERGIC REACTION, SUBSEQUENT ENCOUNTER: ICD-10-CM

## 2025-06-24 RX ORDER — FAMOTIDINE 20 MG/1
20 TABLET, FILM COATED ORAL 2 TIMES DAILY
Qty: 60 TABLET | Refills: 6 | Status: SHIPPED | OUTPATIENT
Start: 2025-06-24

## 2025-06-24 RX ORDER — FAMOTIDINE 20 MG/1
20 TABLET, FILM COATED ORAL 2 TIMES DAILY
Qty: 60 TABLET | Refills: 0 | Status: CANCELLED | OUTPATIENT
Start: 2025-06-24

## 2025-07-10 ENCOUNTER — OFFICE VISIT (OUTPATIENT)
Dept: INTERNAL MEDICINE | Age: 20
End: 2025-07-10
Payer: COMMERCIAL

## 2025-07-10 VITALS
WEIGHT: 159 LBS | TEMPERATURE: 98.2 F | BODY MASS INDEX: 22.26 KG/M2 | SYSTOLIC BLOOD PRESSURE: 129 MMHG | HEART RATE: 73 BPM | HEIGHT: 71 IN | DIASTOLIC BLOOD PRESSURE: 81 MMHG | OXYGEN SATURATION: 97 %

## 2025-07-10 DIAGNOSIS — I51.4 MYOCARDITIS, UNSPECIFIED CHRONICITY, UNSPECIFIED MYOCARDITIS TYPE: ICD-10-CM

## 2025-07-10 DIAGNOSIS — J30.1 ALLERGIC RHINITIS DUE TO POLLEN, UNSPECIFIED SEASONALITY: ICD-10-CM

## 2025-07-10 DIAGNOSIS — J45.20 MILD INTERMITTENT ASTHMA, UNSPECIFIED WHETHER COMPLICATED: ICD-10-CM

## 2025-07-10 DIAGNOSIS — G43.709 CHRONIC MIGRAINE WITHOUT AURA WITHOUT STATUS MIGRAINOSUS, NOT INTRACTABLE: Primary | ICD-10-CM

## 2025-07-10 NOTE — PROGRESS NOTES
"Chief Complaint   Patient presents with    Annual Exam     Physical, Pt states having some elevated HR has been drinking coffee, soda.    Patient is coming back into follow-up with his previous initial visit May 2025 here to review labs from March 10, 2025,, follow-up with migraines, history of asthma    Objective   Vital Signs  Vitals:    07/10/25 1508   BP: 129/81   BP Location: Right arm   Patient Position: Sitting   Pulse: 73   Temp: 98.2 °F (36.8 °C)   SpO2: 97%   Weight: 72.1 kg (159 lb)   Height: 180.3 cm (70.98\")      Body mass index is 22.19 kg/m².  Review of Systems   Constitutional: Negative.    HENT: Negative.     Eyes: Negative.    Respiratory: Negative.     Cardiovascular: Negative.    Gastrointestinal: Negative.    Endocrine: Negative.    Genitourinary: Negative.    Musculoskeletal: Negative.    Allergic/Immunologic: Negative.    Neurological: Negative.    Hematological: Negative.    Psychiatric/Behavioral: Negative.        Physical Exam  Constitutional:       General: He is not in acute distress.     Appearance: Normal appearance.   HENT:      Head: Normocephalic.      Mouth/Throat:      Mouth: Mucous membranes are moist.   Eyes:      Conjunctiva/sclera: Conjunctivae normal.      Pupils: Pupils are equal, round, and reactive to light.   Cardiovascular:      Rate and Rhythm: Normal rate and regular rhythm.      Pulses: Normal pulses.      Heart sounds: Normal heart sounds.   Pulmonary:      Effort: Pulmonary effort is normal.      Breath sounds: Normal breath sounds.   Abdominal:      General: Abdomen is flat. Bowel sounds are normal.      Palpations: Abdomen is soft.   Musculoskeletal:         General: No swelling. Normal range of motion.      Cervical back: Neck supple.   Skin:     General: Skin is warm and dry.      Coloration: Skin is not jaundiced.   Neurological:      General: No focal deficit present.      Mental Status: He is alert and oriented to person, place, and time. Mental status is at " baseline.   Psychiatric:         Mood and Affect: Mood normal.         Behavior: Behavior normal.         Thought Content: Thought content normal.         Judgment: Judgment normal.        Result Review :   Lab Results   Component Value Date    PROBNP 274.1 01/07/2023     CMP          3/10/2025    14:46   CMP   Glucose 73    BUN 10    Creatinine 1.12    EGFR 97.1    Sodium 140    Potassium 4.4    Chloride 102    Calcium 10.1    Total Protein 7.3    Albumin 4.9    Globulin 2.4    Total Bilirubin 0.8    Alkaline Phosphatase 101    AST (SGOT) 16    ALT (SGPT) 12    Albumin/Globulin Ratio 2.0    BUN/Creatinine Ratio 8.9    Anion Gap 8.9      CBC w/diff          3/10/2025    14:46   CBC w/Diff   WBC 4.87    RBC 5.78    Hemoglobin 17.0    Hematocrit 49.3    MCV 85.3    MCH 29.4    MCHC 34.5    RDW 12.0    Platelets 185    Neutrophil Rel % 61.0    Immature Granulocyte Rel % 0.2    Lymphocyte Rel % 28.5    Monocyte Rel % 7.0    Eosinophil Rel % 2.7    Basophil Rel % 0.6          Lab Results   Component Value Date    TSH 1.450 03/10/2025    TSH 5.350 (H) 01/07/2023      Lab Results   Component Value Date    FREET4 1.50 03/10/2025    FREET4 1.64 (H) 01/07/2023                          Visit Diagnoses:    ICD-10-CM ICD-9-CM   1. Chronic migraine without aura without status migrainosus, not intractable  G43.709 346.70   2. Mild intermittent asthma, unspecified whether complicated  J45.20 493.90   3. Allergic rhinitis due to pollen, unspecified seasonality  J30.1 477.0   4. Myocarditis, unspecified chronicity, unspecified myocarditis type  I51.4 429.0       Assessment and Plan   Diagnoses and all orders for this visit:    1. Chronic migraine without aura without status migrainosus, not intractable (Primary)  -     Vitamin B12 anemia; Future  -     Folate anemia; Future  -     Comprehensive Metabolic Panel; Future  -     CBC & Differential; Future    2. Mild intermittent asthma, unspecified whether complicated  -     Vitamin B12  anemia; Future  -     Folate anemia; Future  -     Comprehensive Metabolic Panel; Future  -     CBC & Differential; Future    3. Allergic rhinitis due to pollen, unspecified seasonality  -     Vitamin B12 anemia; Future  -     Folate anemia; Future  -     Comprehensive Metabolic Panel; Future  -     CBC & Differential; Future    4. Myocarditis, unspecified chronicity, unspecified myocarditis type  -     Vitamin B12 anemia; Future  -     Folate anemia; Future  -     Comprehensive Metabolic Panel; Future  -     CBC & Differential; Future    Annual exam preventive measures discussed, patient wears a seatbelt does not smoke, does not drink, he works every day, encouraged more exercise on a regular basis, July 10, 2025    Pediatric BMI = 41 %ile (Z= -0.22) based on CDC (Boys, 2-20 Years) BMI-for-age based on BMI available on 7/10/2025.. BMI is within normal parameters. No other follow-up for BMI required.    Occasional increased heart rates, 1 episode of lightheadedness during a shower this past week otherwise unremarkable, discussed July 10, 2025    Allergic rxns , has taken pepcid prior --     Myocarditis--- after first booster for COVID, 2 to 3 years ago,    Migraines----uses Ubrelvy, as needed, has helped some=== continues magnesium supplement 500 mg at bedtime,    H/o asthma --no new issues , no rx ---cont singulair 10 mg qd     Allegies     Follow Up   Return in about 1 year (around 7/10/2026).  Patient was given instructions and counseling regarding his condition or for health maintenance advice. Please see specific information pulled into the AVS if appropriate.

## (undated) DEVICE — CONN JET HYDRA H20 AUXILIARY DISP

## (undated) DEVICE — SOL IRRG H2O PL/BG 1000ML STRL

## (undated) DEVICE — Device

## (undated) DEVICE — LINER SURG CANSTR SXN S/RIGD 1500CC

## (undated) DEVICE — SOLIDIFIER LIQLOC PLS 1500CC BT

## (undated) DEVICE — Device: Brand: DEFENDO AIR/WATER/SUCTION AND BIOPSY VALVE